# Patient Record
(demographics unavailable — no encounter records)

---

## 2024-11-26 NOTE — HISTORY OF PRESENT ILLNESS
[de-identified] : SELENE HERNANDEZ  is a 85 year patient With a history of left ear surgery and bilateral hearing loss. She is here to have ears cleaned. She denies otalgia or otorrhea.   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 She has CWD mastoidectomy on the left. She tried hearing aids but did not like them  She has a history of reflux FL- no lesions

## 2024-11-26 NOTE — HISTORY OF PRESENT ILLNESS
[de-identified] : SELENE HERNANDEZ  is a 85 year patient With a history of left ear surgery and bilateral hearing loss. She is here to have ears cleaned. She denies otalgia or otorrhea.   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 She has CWD mastoidectomy on the left. She tried hearing aids but did not like them  She has a history of reflux FL- no lesions

## 2024-11-26 NOTE — ASSESSMENT
[FreeTextEntry1] : She has a history of a canal wall down left mastoidectomy and hearing loss.  The mastoid bowl was debrided.  Cerumen was removed from the right ear.  Repeat audiogram was reviewed  Plan -Findings and management options were discussed with the patient. -Continue good ear hygiene -Wax removal drops as needed - Monitor hearing - She may consider repeat hearing aid evaluation - Follow-up in 6 months or earlier if needed

## 2024-11-26 NOTE — PHYSICAL EXAM
[TextEntry] : PHYSICAL EXAM   General: The patient was alert, oriented and in no distress. Voice was clear.   Face: The patient had no facial asymmetry or mass. The skin was unremarkable.  Ears She is hard of hearing External ears were normal without deformity. Ear canals- cerumen impaction  PROCEDURE- EAR MICROSCOPY AND CERUMEN REMOVAL  Indication: cerumen removal Under the microscope, obstructing cerumen was removed atraumatically from the right ear with a suction, curette and/or forceps. Canal: normal. no inflammation. Tympanic membrane: Intact. no perforation or effusion.  Under the microscope, the left mastoid bowl was debrided using a forceps and curette. She has a canal wall down mastoidectomy. The tympanic membrane was intact and adhesive posteriorly. No change. No inflammation or cholesteatoma   Nose: The external nose had no significant deformity. There was no facial tenderness. On anterior rhinoscopy, the nasal mucosa was normal. The anterior septum was grossly midline. There were no visualized polyps, purulence or masses.   Oral cavity: Oral mucosa- normal. Oral and base of tongue- clear and without mass. Gingival and buccal mucosa- moist and without lesions. Palate- the palate moved well. There was no cleft palate. There appeared to be good salivary flow. Oral cavity/oropharynx- no pus, erythema or mass   Neck: The neck was symmetrical. The parotid and submandibular glands were normal without masses. The trachea was midline and there was no unusual crepitus. Thyroid was smooth and nontender and no masses were palpated. No masses   Lymphatics: Cervical adenopathy- none.

## 2024-11-26 NOTE — REVIEW OF SYSTEMS
[Patient Intake Form Reviewed] : Patient intake form was reviewed
[Patient Intake Form Reviewed] : Patient intake form was reviewed
Ambulatory

## 2025-05-29 NOTE — HISTORY OF PRESENT ILLNESS
[de-identified] : SELENE HERNANDEZ is a 86 year old patient Here for follow-up for bilateral hearing loss and left ear surgery.  She bit of bleeding after her last visit but nothing recently.  She has no otalgia or otorrhea.  She thinks she may be ready to try hearing aids again.  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 She has CWD mastoidectomy on the left. She tried hearing aids but did not like them  She has a history of reflux FL- no lesions

## 2025-05-29 NOTE — PHYSICAL EXAM
[TextEntry] : General: The patient was alert, oriented and in no distress. Voice was clear.  Face: The patient had no facial asymmetry or mass. The skin was unremarkable.  Ears She is hard of hearing External ears were normal without deformity. Ear canals- cerumen impaction  PROCEDURE- EAR MICROSCOPY AND CERUMEN REMOVAL  Indication: cerumen removal Under the microscope, a small amount of cerumen was removed atraumatically from the right ear with a suction, curette and/or forceps. Canal: normal. no inflammation. Tympanic membrane: Intact. no perforation or effusion.  Under the microscope, the left mastoid bowl was debrided using a forceps and curette. She has a canal wall down mastoidectomy. The tympanic membrane was intact and adhesive posteriorly. No change. No inflammation or cholesteatoma  Nose: The external nose had no significant deformity. There was no facial tenderness. On anterior rhinoscopy, the nasal mucosa was normal. The anterior septum was grossly midline. There were no visualized polyps, purulence or masses.  Oral cavity: Oral mucosa- normal. Oral and base of tongue- clear and without mass. Gingival and buccal mucosa- moist and without lesions. Palate- the palate moved well. There was no cleft palate. There appeared to be good salivary flow. Oral cavity/oropharynx- no pus, erythema or mass  Neck: The neck was symmetrical. The parotid and submandibular glands were normal without masses. The trachea was midline and there was no unusual crepitus. Thyroid was smooth and nontender and no masses were palpated. No masses  Lymphatics: Cervical adenopathy- none.    AUDIOGRAM- test ordered and the results reviewed and discussed with the patient.  It was compared with her prior audiogram Hearing-right ear-sensorineural hearing loss.  Left ear-mixed hearing loss. Word recognition-AD- 92% Tympanograms- AD- type A, AS- type B

## 2025-05-29 NOTE — ASSESSMENT
[FreeTextEntry1] : The left mastoid was debrided.  Her exam is stable.  There is no inflammation or cholesteatoma.  Repeat audiogram showed no significant change.  Plan -Findings and management options were discussed with the patient. - Continue good ear hygiene and wax management - Monitor hearing - She is going to go for repeat hearing aid evaluation - Follow-up in 6 months or earlier if needed